# Patient Record
Sex: MALE | Race: OTHER | URBAN - METROPOLITAN AREA
[De-identification: names, ages, dates, MRNs, and addresses within clinical notes are randomized per-mention and may not be internally consistent; named-entity substitution may affect disease eponyms.]

---

## 2022-06-15 ENCOUNTER — OFFICE VISIT (OUTPATIENT)
Dept: FAMILY MEDICINE CLINIC | Age: 47
End: 2022-06-15
Payer: COMMERCIAL

## 2022-06-15 ENCOUNTER — APPOINTMENT (OUTPATIENT)
Dept: FAMILY MEDICINE CLINIC | Age: 47
End: 2022-06-15

## 2022-06-15 VITALS
SYSTOLIC BLOOD PRESSURE: 152 MMHG | RESPIRATION RATE: 16 BRPM | WEIGHT: 209.6 LBS | DIASTOLIC BLOOD PRESSURE: 73 MMHG | BODY MASS INDEX: 32.9 KG/M2 | HEART RATE: 87 BPM | OXYGEN SATURATION: 99 % | HEIGHT: 67 IN | TEMPERATURE: 98.2 F

## 2022-06-15 DIAGNOSIS — Z00.00 ROUTINE ADULT HEALTH MAINTENANCE: ICD-10-CM

## 2022-06-15 DIAGNOSIS — I10 BENIGN ESSENTIAL HTN: ICD-10-CM

## 2022-06-15 DIAGNOSIS — Z78.9 USES SPANISH AS PRIMARY SPOKEN LANGUAGE: ICD-10-CM

## 2022-06-15 DIAGNOSIS — Z11.3 SCREEN FOR STD (SEXUALLY TRANSMITTED DISEASE): ICD-10-CM

## 2022-06-15 DIAGNOSIS — K64.9 BLEEDING HEMORRHOID: ICD-10-CM

## 2022-06-15 DIAGNOSIS — Z86.59 HISTORY OF DEPRESSION: ICD-10-CM

## 2022-06-15 DIAGNOSIS — Z76.89 ESTABLISHING CARE WITH NEW DOCTOR, ENCOUNTER FOR: Primary | ICD-10-CM

## 2022-06-15 PROCEDURE — 99204 OFFICE O/P NEW MOD 45 MIN: CPT | Performed by: STUDENT IN AN ORGANIZED HEALTH CARE EDUCATION/TRAINING PROGRAM

## 2022-06-15 NOTE — PROGRESS NOTES
Kip Cid (: 1975) is a 42 y.o. male, new to provider, here for:    ASSESSMENT/PLAN:    ICD-10-CM ICD-9-CM   1. Establishing care with new doctor, encounter for  Z76.89 V65.8   2. Uses Icelandic as primary spoken language  Z78.9 V40.1   3. Routine adult health maintenance  Z00.00 V70.0   4. Benign essential HTN  I10 401.1   5. Screen for STD (sexually transmitted disease)  Z11.3 V74.5   6. Bleeding hemorrhoid  K64.9 455.8   7. History of depression  Z86.59 V11.8     #HTN - elevated  BP cuff at home? Yes  Patient reports taking BP medication once daily 2.5mg, unsure which med, agreeable to bring to follow up visit. ? lisinopril or ?amlodipine  Reviewed goals of BP and encouraged to monitor on occasion and if blood pressure measurements at home are close to or higher than 140/90 frequently then we should make adjustments to medication regimen. Reviewed risk of stroke or cardiovascular changes with persistently elevated BP. BP Readings from Last 3 Encounters:   06/15/22 (!) 152/73     #Bleeding hemorrhoids ~1wk   Tried hemorrhoid cream with some relief   Reviewed optimal use of OTC management and provided instructions. Would offer GI referral if limited improvement with conservative tx. Reports had colonoscopy 5-6yrs ago without abnormality in VB, unsure who performed it. #Tobacco use disorder - 1ppd desde 20y/o - contemplative  Reviewed that the best rates of smoking cessation success are found with a combination of behavioral changes, medications, and nicotine replacement therapy. Encouraged to follow up for assistance if interested. #Hx of depression   Previously was on treatment, unsure which medication with good response. Reviewed to follow up if symptoms return as treatment can take up to 2mo to become effective.      Orders Placed This Encounter    HEMOGLOBIN A1C WITH EAG     Standing Status:   Future     Number of Occurrences:   1     Standing Expiration Date:   6/15/2023   Didi Dominguez CBC WITH AUTOMATED DIFF     Standing Status:   Future     Number of Occurrences:   1     Standing Expiration Date:   8/82/5716    METABOLIC PANEL, COMPREHENSIVE     Standing Status:   Future     Number of Occurrences:   1     Standing Expiration Date:   6/15/2023    T4, FREE     Standing Status:   Future     Number of Occurrences:   1     Standing Expiration Date:   6/15/2023    LIPID PANEL     Standing Status:   Future     Number of Occurrences:   1     Standing Expiration Date:   6/15/2023    TSH 3RD GENERATION     Standing Status:   Future     Number of Occurrences:   1     Standing Expiration Date:   6/15/2023    VITAMIN D, 25 HYDROXY     Standing Status:   Future     Number of Occurrences:   1     Standing Expiration Date:   6/15/2023    HIV 1/2 AG/AB, 4TH GENERATION,W RFLX CONFIRM     Standing Status:   Future     Number of Occurrences:   1     Standing Expiration Date:   6/16/2023    CHLAMYDIA/NEISSERIA AMPLIFICATION     Standing Status:   Future     Number of Occurrences:   1     Standing Expiration Date:   6/15/2023     Order Specific Question:   Specimen type/source     Answer:   Urine [258]    T PALLIDUM AB     Standing Status:   Future     Number of Occurrences:   1     Standing Expiration Date:   6/15/2023    HSV 1/2 AB, IGM     Standing Status:   Future     Number of Occurrences:   1     Standing Expiration Date:   6/16/2023    HEPATITIS C AB     Standing Status:   Future     Number of Occurrences:   1     Standing Expiration Date:   6/16/2023     Return in about 1 week (around 6/22/2022) for 30 min follow up, BP check, bring all meds. SUBJECTIVE/OBJECTIVE:  Extensive review of medical history and medications completed to facilitate transfer of care. VALERIE 2/7 6/15/2022   Feeling nervous, anxious or on edge?  0   Not being able to stop or control worrying? 0   VALERIE-2 Subtotal 0     3 most recent PHQ Screens 6/15/2022   Little interest or pleasure in doing things Not at all   Feeling down, depressed, irritable, or hopeless Not at all   Total Score PHQ 2 0   Trouble falling or staying asleep, or sleeping too much Not at all   Feeling tired or having little energy Not at all   Poor appetite, weight loss, or overeating Not at all   Feeling bad about yourself - or that you are a failure or have let yourself or your family down Not at all   Trouble concentrating on things such as school, work, reading, or watching TV Not at all   Moving or speaking so slowly that other people could have noticed; or the opposite being so fidgety that others notice Not at all   Thoughts of being better off dead, or hurting yourself in some way Not at all   PHQ 9 Score 0   How difficult have these problems made it for you to do your work, take care of your home and get along with others Not difficult at all     No current outpatient medications on file. No current facility-administered medications for this visit. Visit Vitals  BP (!) 152/73 (BP 1 Location: Right arm, BP Patient Position: Sitting, BP Cuff Size: Adult)   Pulse 87   Temp 98.2 °F (36.8 °C) (Temporal)   Resp 16   Ht 5' 7.13\" (1.705 m)   Wt 209 lb 9.6 oz (95.1 kg)   SpO2 99%   BMI 32.70 kg/m²     Physical Exam  Vitals and nursing note reviewed. Constitutional:       General: He is not in acute distress. Interventions: Face mask in place. Cardiovascular:      Rate and Rhythm: Normal rate. Pulses: Normal pulses. Pulmonary:      Effort: Pulmonary effort is normal. No respiratory distress. Neurological:      Mental Status: He is alert and oriented to person, place, and time. Gait: Gait normal.   Psychiatric:         Mood and Affect: Mood normal.         Behavior: Behavior normal.         Thought Content:  Thought content normal.         Judgment: Judgment normal.             Kristen Rodriguez,   Family Medicine  06/15/2022

## 2022-06-15 NOTE — PATIENT INSTRUCTIONS
Increase hydration and fiber intake  Increasing dietary fiber and water intake is the best way to soften and bulk the stool. The recommended dietary fiber intake is between 20 and 35 grams per day. Think lots of fresh foods, fruits and veggies have a lot of fiber. Sitz bath  Warm sitz baths, which can relax the anal sphincter and improve blood flow to the anal mucosa, are recommended for patients with anal fissures and hemorrhoids. During a sitz bath, the anus is immersed in warm water for approximately 10 to 15 minutes two to three times daily. Sitz bath kits are available in most [facility], and portable bowls can be used at work or school. At home, it is also possible to use a bathtub for sitz bath by filling it with two to three inches of warm water. Additives such as soap and bubble bath are not recommended. After a sitz bath, it is important to towel dry the anal area well. Stool softeners, oral:    Docusate sodium 100 to 200 mg orally two times per day Decreases straining. Astringent and protectant:     Witch hazel (eg, Tucks, Preparation H pads) As needed up to six times per day or after each bowel movement     Corticosteroids, topical:    Hydrocortisone rectal creams 1 to 2.5% (eg, Anusol-HC, Preparation H, Proctosol); hydrocortisone rectal suppository 25 to 30 mg (eg, Anusol-HC) Rectal cream: Apply sparingly up to two times daily  Suppository: 1 suppository twice daily Provides local anti-inflammatory and analgesic effect. For intermittent short-term use (ie, no more than seven days) due to risk of mucosal thinning; refer to text. Avoid in persons with local infection. Variable systemic absorption; caution in pregnancy. Local numbing agents for:    ?Temporary relief of acute pain and pruritus. ? Intended for short-term, intermittent use.     Benzocaine 5 to 20% rectal ointment (eg, Americaine) Sparingly up to six times per day   OR  Pramoxine 1% rectal foam, ointment, wipes (eg, Proctofoam, Pramox) As needed up to five times daily

## 2022-06-15 NOTE — PROGRESS NOTES
Femi King is a 42 y.o. male (: 1975) presenting to address:  Femi King is a 42 y.o. male (: 1975) presenting to address:    No chief complaint on file. Vitals:    06/15/22 1448   BP: (!) 152/73   Pulse: 87   Resp: 16   Temp: 98.2 °F (36.8 °C)   TempSrc: Temporal   SpO2: 99%   Weight: 209 lb 9.6 oz (95.1 kg)   Height: 5' 7.13\" (1.705 m)   PainSc:   0 - No pain       Hearing/Vision:   No exam data present    Learning Assessment:     Learning Assessment 6/15/2022   PRIMARY LEARNER Patient   HIGHEST LEVEL OF EDUCATION - PRIMARY LEARNER  2 YEARS OF COLLEGE   PRIMARY LANGUAGE Kinyarwanda   LEARNER PREFERENCE PRIMARY DEMONSTRATION     LISTENING     READING   ANSWERED BY patient   RELATIONSHIP SELF     Depression Screening:   No flowsheet data found. Fall Risk Assessment:     Fall Risk Assessment, last 12 mths 6/15/2022   Able to walk? Yes   Fall in past 12 months? 0   Do you feel unsteady? 0   Are you worried about falling 0     Abuse Screening:     Abuse Screening Questionnaire 6/15/2022   Do you ever feel afraid of your partner? N   Are you in a relationship with someone who physically or mentally threatens you? N   Is it safe for you to go home? Y     ADL Assessment:   No flowsheet data found. Coordination of Care Questionaire:   1. \"Have you been to the ER, urgent care clinic since your last visit? Hospitalized since your last visit? \" No    2. \"Have you seen or consulted any other health care providers outside of the [address] [place] [address] since your last visit? \" No     3. For patients aged 39-70: Has the patient had a colonoscopy / FIT/ Cologuard? NA - based on age      If the patient is female:    4. For patients aged 41-77: Has the patient had a mammogram within the past 2 years? NA - based on age or sex  See top three    5. For patients aged 21-65: Has the patient had a pap smear? NA - based on age or sex    Advanced Directive:   1.  Do you have an Advanced Directive? NO    2. Would you like information on Advanced Directives? NO  No chief complaint on file. There were no vitals filed for this visit. Hearing/Vision:   No exam data present    Learning Assessment:   No flowsheet data found. Depression Screening:   No flowsheet data found. Fall Risk Assessment:   No flowsheet data found. Abuse Screening:   No flowsheet data found. ADL Assessment:   No flowsheet data found.

## 2022-06-16 LAB
CHLAMYDIA AMPLIFIED URINE: NEGATIVE
GC AMPLIFIED URINE,919: NEGATIVE
TRICH NUCU, 17621: NEGATIVE

## 2022-06-17 LAB
25(OH)D3 SERPL-MCNC: 29.1 NG/ML (ref 32–100)
A-G RATIO,AGRAT: 1.8 RATIO (ref 1.1–2.6)
ABSOLUTE LYMPHOCYTE COUNT, 10803: 1 K/UL (ref 1–4.8)
ALBUMIN SERPL-MCNC: 4.6 G/DL (ref 3.5–5)
ALP SERPL-CCNC: 95 U/L (ref 25–115)
ALT SERPL-CCNC: 24 U/L (ref 5–40)
ANION GAP SERPL CALC-SCNC: 11 MMOL/L (ref 3–15)
AST SERPL W P-5'-P-CCNC: 21 U/L (ref 10–37)
AVG GLU, 10930: 130 MG/DL (ref 91–123)
BASOPHILS # BLD: 0 K/UL (ref 0–0.2)
BASOPHILS NFR BLD: 0 % (ref 0–2)
BILIRUB SERPL-MCNC: 0.2 MG/DL (ref 0.2–1.2)
BUN SERPL-MCNC: 15 MG/DL (ref 6–22)
CALCIUM SERPL-MCNC: 9.6 MG/DL (ref 8.4–10.5)
CHLORIDE SERPL-SCNC: 103 MMOL/L (ref 98–110)
CHOLEST SERPL-MCNC: 226 MG/DL (ref 110–200)
CO2 SERPL-SCNC: 26 MMOL/L (ref 20–32)
CREAT SERPL-MCNC: 0.9 MG/DL (ref 0.5–1.2)
EOSINOPHIL # BLD: 0.1 K/UL (ref 0–0.5)
EOSINOPHIL NFR BLD: 2 % (ref 0–6)
ERYTHROCYTE [DISTWIDTH] IN BLOOD BY AUTOMATED COUNT: 14.6 % (ref 10–15.5)
GLOBULIN,GLOB: 2.5 G/DL (ref 2–4)
GLOMERULAR FILTRATION RATE: >60 ML/MIN/1.73 SQ.M.
GLUCOSE SERPL-MCNC: 100 MG/DL (ref 70–99)
GRANULOCYTES,GRANS: 71 % (ref 40–75)
HBA1C MFR BLD HPLC: 6.2 % (ref 4.8–5.6)
HCT VFR BLD AUTO: 49.6 % (ref 36.6–51.9)
HCV AB SER IA-ACNC: NORMAL
HDLC SERPL-MCNC: 4.7 MG/DL (ref 0–5)
HDLC SERPL-MCNC: 48 MG/DL
HGB BLD-MCNC: 15.4 G/DL (ref 13.2–17.3)
HIV -1/0/2 AG/AB WITH REFLEX, 13463: NON REACTIVE
HIV 1 & 2 AB SER-IMP: NORMAL
LDL/HDL RATIO,LDHD: 2.7
LDLC SERPL CALC-MCNC: 128 MG/DL (ref 50–99)
LYMPHOCYTES, LYMLT: 15 % (ref 20–45)
MCH RBC QN AUTO: 28 PG (ref 26–34)
MCHC RBC AUTO-ENTMCNC: 31 G/DL (ref 31–36)
MCV RBC AUTO: 91 FL (ref 80–95)
MONOCYTES # BLD: 0.8 K/UL (ref 0.1–1)
MONOCYTES NFR BLD: 12 % (ref 3–12)
NEUTROPHILS # BLD AUTO: 4.9 K/UL (ref 1.8–7.7)
NON-HDL CHOLESTEROL, 011976: 178 MG/DL
PLATELET # BLD AUTO: 269 K/UL (ref 140–440)
PMV BLD AUTO: 10.8 FL (ref 9–13)
POTASSIUM SERPL-SCNC: 4.2 MMOL/L (ref 3.5–5.5)
PROT SERPL-MCNC: 7.1 G/DL (ref 6.4–8.3)
RBC # BLD AUTO: 5.46 M/UL (ref 3.8–5.8)
SODIUM SERPL-SCNC: 140 MMOL/L (ref 133–145)
SYPHILIS (T.PALLIDUM) IGG/IGM: NON REACTIVE
T4 FREE SERPL-MCNC: 1 NG/DL (ref 0.9–1.8)
TRIGL SERPL-MCNC: 250 MG/DL (ref 40–149)
TSH SERPL DL<=0.005 MIU/L-ACNC: 3.5 MCU/ML (ref 0.27–4.2)
VLDLC SERPL CALC-MCNC: 50 MG/DL (ref 8–30)
WBC # BLD AUTO: 7 K/UL (ref 4–11)

## 2022-06-21 NOTE — PROGRESS NOTES
Jim Matson (: 1975) is a 42 y.o. male, established patient, here for:    ASSESSMENT/PLAN:    ICD-10-CM ICD-9-CM   1. Dyslipidemia, goal LDL below 100  E78.5 272.4   2. Benign essential HTN  I10 401.1   3. Uses Macedonian as primary spoken language  Z78.9 V40.1   4. Bleeding hemorrhoid  K64.9 455.8   5. History of depression  Z86.59 V11.8   6. Tobacco use  Z72.0 305.1     #Vit D Def - reviewed dx and recommended supplement dosing    #HLD - Goal < 100  Reviewed ASCVD and agreeable to start mod intensity statin. Lab Results   Component Value Date/Time    LDL, calculated 128 (H) 06/15/2022 03:41 PM     The 10-year ASCVD risk score (Karlee Stewart et al., 2013) is: 8.1%    Values used to calculate the score:      Age: 42 years      Sex: Male      Is Non- : No      Diabetic: No      Tobacco smoker: Yes      Systolic Blood Pressure: 997 mmHg      Is BP treated: Yes      HDL Cholesterol: 48 mg/dL      Total Cholesterol: 226 mg/dL    #HTN - averages remain above goal  BP cuff at home? Yes; Brought in home readings since last visit with average ~559 systolic  Has been using Lisinopril-HCTZ without notable AE; Agreeable to increase dose to 20-25 to reach target BP    BP Readings from Last 3 Encounters:   22 132/84   06/15/22 (!) 152/73     #Prediabetes - new dx  Reviewed diagnosis and recommendations for behavioral/dietary changes to improve. Encouraged to continue once to twice yearly monitoring of A1C. Also discussed consideration of initiating Metformin to help reduce insulin resistance. Lab Results   Component Value Date/Time    Hemoglobin A1c 6.2 (H) 06/15/2022 03:41 PM     #Bleeding hemorrhoids ~1wk   Tried hemorrhoid cream with some relief   Reviewed optimal use of OTC management and provided instructions. Would offer GI referral if limited improvement with conservative tx. Reports had colonoscopy 5-6yrs ago without abnormality in VB, unsure who performed it.    #Tobacco use disorder - 1ppd desde 20y/o - contemplative  Reviewed that the best rates of smoking cessation success are found with a combination of behavioral changes, medications, and nicotine replacement therapy. Encouraged to follow up for assistance if interested.      #Hx of depression   Previously was on treatment, unsure which medication with good response. Reviewed to follow up if symptoms return as treatment can take up to 2mo to become effective. #Uses Cypriot as primary spoken language  [ entire visit conducted in [address] [facility] ]     Orders Placed This Encounter    lisinopril-hydroCHLOROthiazide (PRINZIDE, ZESTORETIC) 20-25 mg per tablet     Sig: Take 1 Tablet by mouth daily. Indications: high blood pressure     Dispense:  90 Tablet     Refill:  1    atorvastatin (LIPITOR) 40 mg tablet     Sig: Take 1 Tablet by mouth nightly. Indications: high cholesterol, \     Dispense:  90 Tablet     Refill:  2     Return in about 3 months (around 9/22/2022) for POC A1C check, BP check. SUBJECTIVE/OBJECTIVE:  Last PCP visit: 6/15/2022    Since last visit:   Any changes in health, procedures, hospital visits, or specialist visits? Denies  Tolerating medications without adverse reactions? Reports good compliance with Rx without notable adverse effects. Current Outpatient Medications   Medication Sig    lisinopril-hydroCHLOROthiazide (PRINZIDE, ZESTORETIC) 20-25 mg per tablet Take 1 Tablet by mouth daily. Indications: high blood pressure    atorvastatin (LIPITOR) 40 mg tablet Take 1 Tablet by mouth nightly. Indications: high cholesterol, \     No current facility-administered medications for this visit. Visit Vitals  /84 (BP 1 Location: Right arm, BP Patient Position: Sitting, BP Cuff Size: Adult)   Pulse 97   Temp 98.5 °F (36.9 °C) (Temporal)   Resp 16   Ht 5' 7.13\" (1.705 m)   Wt 210 lb (95.3 kg)   SpO2 100%   BMI 32.76 kg/m²     Physical Exam  Vitals and nursing note reviewed. Constitutional:       General: He is not in acute distress. Interventions: Face mask in place. Cardiovascular:      Rate and Rhythm: Normal rate. Pulses: Normal pulses. Pulmonary:      Effort: Pulmonary effort is normal. No respiratory distress. Neurological:      Mental Status: He is alert and oriented to person, place, and time. Gait: Gait normal.   Psychiatric:         Mood and Affect: Mood normal.         Behavior: Behavior normal.         Thought Content:  Thought content normal.         Judgment: Judgment normal.           Leonora Polk,   Family Medicine  06/22/2022

## 2022-06-21 NOTE — PROGRESS NOTES
Will review in detail at follow up:     Future Appointments  6/22/2022  3:00 PM    Milady Amaro, DO      BSMA                BS AMB    Vit D def  , The 10-year ASCVD risk score (Olive Rasmussen, et al., 2013) is: 8.8% - would offer mod intensity statin

## 2022-06-22 ENCOUNTER — OFFICE VISIT (OUTPATIENT)
Dept: FAMILY MEDICINE CLINIC | Age: 47
End: 2022-06-22
Payer: COMMERCIAL

## 2022-06-22 VITALS
BODY MASS INDEX: 32.96 KG/M2 | TEMPERATURE: 98.5 F | HEIGHT: 67 IN | WEIGHT: 210 LBS | OXYGEN SATURATION: 100 % | RESPIRATION RATE: 16 BRPM | SYSTOLIC BLOOD PRESSURE: 132 MMHG | DIASTOLIC BLOOD PRESSURE: 84 MMHG | HEART RATE: 97 BPM

## 2022-06-22 DIAGNOSIS — Z78.9 USES SPANISH AS PRIMARY SPOKEN LANGUAGE: ICD-10-CM

## 2022-06-22 DIAGNOSIS — I10 BENIGN ESSENTIAL HTN: ICD-10-CM

## 2022-06-22 DIAGNOSIS — E78.5 DYSLIPIDEMIA, GOAL LDL BELOW 100: Primary | ICD-10-CM

## 2022-06-22 DIAGNOSIS — Z86.59 HISTORY OF DEPRESSION: ICD-10-CM

## 2022-06-22 DIAGNOSIS — Z72.0 TOBACCO USE: ICD-10-CM

## 2022-06-22 DIAGNOSIS — K64.9 BLEEDING HEMORRHOID: ICD-10-CM

## 2022-06-22 PROCEDURE — 99214 OFFICE O/P EST MOD 30 MIN: CPT | Performed by: STUDENT IN AN ORGANIZED HEALTH CARE EDUCATION/TRAINING PROGRAM

## 2022-06-22 RX ORDER — LISINOPRIL 10 MG/1
10 TABLET ORAL DAILY
COMMUNITY
End: 2022-06-22 | Stop reason: CLARIF

## 2022-06-22 RX ORDER — LISINOPRIL AND HYDROCHLOROTHIAZIDE 20; 25 MG/1; MG/1
1 TABLET ORAL DAILY
Qty: 90 TABLET | Refills: 1 | Status: SHIPPED | OUTPATIENT
Start: 2022-06-22 | End: 2022-09-27 | Stop reason: SDUPTHER

## 2022-06-22 RX ORDER — ATORVASTATIN CALCIUM 40 MG/1
40 TABLET, FILM COATED ORAL
Qty: 90 TABLET | Refills: 2 | Status: SHIPPED | OUTPATIENT
Start: 2022-06-22 | End: 2022-09-27 | Stop reason: SINTOL

## 2022-06-22 NOTE — PATIENT INSTRUCTIONS
Blood pressure goals:  · If blood pressure measurements at home are close to or higher than 140/90 frequently come in sooner for evaluation and consideration of adjustment of blood pressure medication.

## 2022-06-22 NOTE — PROGRESS NOTES
Jj Melgar is a 42 y.o. male (: 1975) presenting to address:    Chief Complaint   Patient presents with    Blood Pressure Check     1 week f/u for bp       Vitals:    22 1452   BP: 132/84   Pulse: 97   Resp: 16   Temp: 98.5 °F (36.9 °C)   TempSrc: Temporal   SpO2: 100%   Weight: 210 lb (95.3 kg)   Height: 5' 7.13\" (1.705 m)   PainSc:   0 - No pain       Hearing/Vision:   No exam data present    Learning Assessment:     Learning Assessment 6/15/2022   PRIMARY LEARNER Patient   HIGHEST LEVEL OF EDUCATION - PRIMARY LEARNER  2 YEARS OF COLLEGE   PRIMARY LANGUAGE Thai   LEARNER PREFERENCE PRIMARY DEMONSTRATION     LISTENING     READING   ANSWERED BY patient   RELATIONSHIP SELF     Depression Screening:     3 most recent PHQ Screens 2022   Little interest or pleasure in doing things Not at all   Feeling down, depressed, irritable, or hopeless Not at all   Total Score PHQ 2 0   Trouble falling or staying asleep, or sleeping too much -   Feeling tired or having little energy -   Poor appetite, weight loss, or overeating -   Feeling bad about yourself - or that you are a failure or have let yourself or your family down -   Trouble concentrating on things such as school, work, reading, or watching TV -   Moving or speaking so slowly that other people could have noticed; or the opposite being so fidgety that others notice -   Thoughts of being better off dead, or hurting yourself in some way -   PHQ 9 Score -   How difficult have these problems made it for you to do your work, take care of your home and get along with others -     Fall Risk Assessment:     Fall Risk Assessment, last 12 mths 6/15/2022   Able to walk? Yes   Fall in past 12 months? 0   Do you feel unsteady? 0   Are you worried about falling 0     Abuse Screening:     Abuse Screening Questionnaire 6/15/2022   Do you ever feel afraid of your partner?  N   Are you in a relationship with someone who physically or mentally threatens you? N   Is it safe for you to go home? Y     ADL Assessment:   No flowsheet data found. Coordination of Care Questionaire:   1. \"Have you been to the ER, urgent care clinic since your last visit? Hospitalized since your last visit? \" No    2. \"Have you seen or consulted any other health care providers outside of the [address] [place] [address] since your last visit? \" No     3. For patients aged 39-70: Has the patient had a colonoscopy / FIT/ Cologuard? NA - based on age      If the patient is female:    4. For patients aged 41-77: Has the patient had a mammogram within the past 2 years? NA - based on age or sex  See top three    5. For patients aged 21-65: Has the patient had a pap smear? NA - based on age or sex    Advanced Directive:   1. Do you have an Advanced Directive? NO    2. Would you like information on Advanced Directives?  NO

## 2022-07-01 ENCOUNTER — TELEPHONE (OUTPATIENT)
Dept: FAMILY MEDICINE CLINIC | Age: 47
End: 2022-07-01

## 2022-07-01 NOTE — TELEPHONE ENCOUNTER
Pt stated that the medication is not doing anything for him. Pt stated that he is unable to sleep.  Would like to change medications(atorvastatin)

## 2022-09-19 NOTE — PROGRESS NOTES
Que Strong (: 1975) is a 42 y.o. male, ESTABLISHED patient, here for FOLLOW UP:    ICD-10-CM ICD-9-CM   1. HTN, goal below 130/80  I10 401.9   2. Prediabetes  R73.03 790.29   3. Benign essential HTN  I10 401.1   4. Dyslipidemia, goal LDL below 100  E78.5 272.4   5. Uses Persian as primary spoken language  Z78.9 V40.1   6. Tobacco use  Z72.0 305.1     Assessment   Plan     #Vit D Def - reviewed dx and recommended supplement dosing  Lab Results   Component Value Date/Time    VITAMIN D, 25-HYDROXY 29.1 (L) 06/15/2022 03:41 PM       #HLD - Goal < 100  Couldn't sleep with Lipitor 40mg, c/o marreo and discontinued; Plan to work on dietary/behavioral efforts and monitor lipid panel. Lab Results   Component Value Date/Time     LDL, calculated 128 (H) 06/15/2022 03:41 PM      The 10-year ASCVD risk score (J Luis Arriaza, et al., 2013) is: 8.1%    Values used to calculate the score:      Age: 42 years      Sex: Male      Is Non- : No      Diabetic: No      Tobacco smoker: Yes      Systolic Blood Pressure: 729 mmHg      Is BP treated: Yes      HDL Cholesterol: 48 mg/dL      Total Cholesterol: 226 mg/dL     #HTN - Goal <130/80  BP cuff at home? Yes  Tolerating medications without notable AE, will continue regimen unchanged    BP Readings from Last 3 Encounters:   22 130/81   22 132/84   06/15/22 (!) 152/73     #Prediabetes - improved!! Commended on success  Reviewed diagnosis and recommendations for behavioral/dietary changes to improve. Encouraged to continue once to twice yearly monitoring of A1C. Also discussed consideration of initiating Metformin to help reduce insulin resistance.      Last Point of Care HGB A1C  Hemoglobin A1c (POC)   Date Value Ref Range Status   2022 5.7 % Final             Lab Results   Component Value Date/Time     Hemoglobin A1c 6.2 (H) 06/15/2022 03:41 PM      #Tobacco use disorder - 1ppd desde 20y/o - contemplative  Reviewed that the best rates of smoking cessation success are found with a combination of behavioral changes, medications, and nicotine replacement therapy. Encouraged to follow up for assistance if interested. #Hx of depression   Previously was on treatment, unsure which medication with good response. Reviewed to follow up if symptoms return as treatment can take up to 2mo to become effective. #Uses Thai as primary spoken language  [ entire visit conducted in [address] [facility] ]            Orders Placed This Encounter    AMB POC HEMOGLOBIN A1C    lisinopril-hydroCHLOROthiazide (PRINZIDE, ZESTORETIC) 20-25 mg per tablet     Sig: Take 1 Tablet by mouth daily. Indications: high blood pressure     Dispense:  90 Tablet     Refill:  3     Return in about 6 months (around 3/27/2023) for 30 min follow up, routine care with PCP. Subjective   Last PCP visit: 6/22/2022  Since last visit:   Any changes in health, procedures, hospital visits, or specialist visits? Denies   Tolerating medications without adverse reactions? Reports good compliance with Rx without notable adverse effects. Current Outpatient Medications   Medication Instructions    lisinopril-hydroCHLOROthiazide (PRINZIDE, ZESTORETIC) 20-25 mg per tablet 1 Tablet, Oral, DAILY      Objective   Visit Vitals  /81 (BP 1 Location: Right arm, BP Patient Position: Sitting, BP Cuff Size: Adult)   Pulse 72   Temp 98.6 °F (37 °C) (Temporal)   Resp 16   Ht 5' 7.13\" (1.705 m)   Wt 205 lb 9.6 oz (93.3 kg)   SpO2 96%   BMI 32.08 kg/m²     Physical Exam  Vitals and nursing note reviewed. Constitutional:       General: He is not in acute distress. Interventions: Face mask in place. Cardiovascular:      Rate and Rhythm: Normal rate. Pulses: Normal pulses. Pulmonary:      Effort: Pulmonary effort is normal. No respiratory distress. Neurological:      Mental Status: He is alert and oriented to person, place, and time.       Gait: Gait normal.   Psychiatric: Mood and Affect: Mood normal.         Behavior: Behavior normal.         Thought Content:  Thought content normal.         Judgment: Judgment normal.          Chanelle Degroot,   Family Medicine  09/27/2022

## 2022-09-27 ENCOUNTER — OFFICE VISIT (OUTPATIENT)
Dept: FAMILY MEDICINE CLINIC | Age: 47
End: 2022-09-27
Payer: COMMERCIAL

## 2022-09-27 VITALS
HEIGHT: 67 IN | OXYGEN SATURATION: 96 % | BODY MASS INDEX: 32.27 KG/M2 | WEIGHT: 205.6 LBS | TEMPERATURE: 98.6 F | SYSTOLIC BLOOD PRESSURE: 130 MMHG | HEART RATE: 72 BPM | RESPIRATION RATE: 16 BRPM | DIASTOLIC BLOOD PRESSURE: 81 MMHG

## 2022-09-27 DIAGNOSIS — I10 BENIGN ESSENTIAL HTN: ICD-10-CM

## 2022-09-27 DIAGNOSIS — R73.03 PREDIABETES: ICD-10-CM

## 2022-09-27 DIAGNOSIS — Z72.0 TOBACCO USE: ICD-10-CM

## 2022-09-27 DIAGNOSIS — I10 HTN, GOAL BELOW 130/80: Primary | ICD-10-CM

## 2022-09-27 DIAGNOSIS — E78.5 DYSLIPIDEMIA, GOAL LDL BELOW 100: ICD-10-CM

## 2022-09-27 DIAGNOSIS — Z78.9 USES SPANISH AS PRIMARY SPOKEN LANGUAGE: ICD-10-CM

## 2022-09-27 LAB — HBA1C MFR BLD HPLC: 5.7 %

## 2022-09-27 PROCEDURE — 99214 OFFICE O/P EST MOD 30 MIN: CPT | Performed by: STUDENT IN AN ORGANIZED HEALTH CARE EDUCATION/TRAINING PROGRAM

## 2022-09-27 PROCEDURE — 83036 HEMOGLOBIN GLYCOSYLATED A1C: CPT | Performed by: STUDENT IN AN ORGANIZED HEALTH CARE EDUCATION/TRAINING PROGRAM

## 2022-09-27 RX ORDER — LISINOPRIL AND HYDROCHLOROTHIAZIDE 20; 25 MG/1; MG/1
1 TABLET ORAL DAILY
Qty: 90 TABLET | Refills: 3 | Status: SHIPPED | OUTPATIENT
Start: 2022-09-27

## 2022-09-27 NOTE — PROGRESS NOTES
Leslie Villarreal is a 42 y.o. male (: 1975) presenting to address:    Chief Complaint   Patient presents with    Blood Pressure Check    Diabetes     a1c       Vitals:    22 1424   BP: 130/81   Pulse: 72   Resp: 16   Temp: 98.6 °F (37 °C)   TempSrc: Temporal   SpO2: 96%   Weight: 205 lb 9.6 oz (93.3 kg)   Height: 5' 7.13\" (1.705 m)   PainSc:   0 - No pain       Hearing/Vision:   No results found. Learning Assessment:     Learning Assessment 6/15/2022   PRIMARY LEARNER Patient   HIGHEST LEVEL OF EDUCATION - PRIMARY LEARNER  2 YEARS OF COLLEGE   PRIMARY LANGUAGE Romansh   LEARNER PREFERENCE PRIMARY DEMONSTRATION     LISTENING     READING   ANSWERED BY patient   RELATIONSHIP SELF     Depression Screening:     3 most recent PHQ Screens 2022   Little interest or pleasure in doing things Not at all   Feeling down, depressed, irritable, or hopeless Not at all   Total Score PHQ 2 0   Trouble falling or staying asleep, or sleeping too much -   Feeling tired or having little energy -   Poor appetite, weight loss, or overeating -   Feeling bad about yourself - or that you are a failure or have let yourself or your family down -   Trouble concentrating on things such as school, work, reading, or watching TV -   Moving or speaking so slowly that other people could have noticed; or the opposite being so fidgety that others notice -   Thoughts of being better off dead, or hurting yourself in some way -   PHQ 9 Score -   How difficult have these problems made it for you to do your work, take care of your home and get along with others -     Fall Risk Assessment:     Fall Risk Assessment, last 12 mths 6/15/2022   Able to walk? Yes   Fall in past 12 months? 0   Do you feel unsteady? 0   Are you worried about falling 0     Abuse Screening:     Abuse Screening Questionnaire 6/15/2022   Do you ever feel afraid of your partner?  N   Are you in a relationship with someone who physically or mentally threatens you? N   Is it safe for you to go home? Y     ADL Assessment:   No flowsheet data found. Coordination of Care Questionaire:   1. \"Have you been to the ER, urgent care clinic since your last visit? Hospitalized since your last visit? \" No    2. \"Have you seen or consulted any other health care providers outside of the [address] [place] [address] since your last visit? \" No     3. For patients aged 39-70: Has the patient had a colonoscopy / FIT/ Cologuard? NA - based on age      If the patient is female:    4. For patients aged 41-77: Has the patient had a mammogram within the past 2 years? NA - based on age or sex  See top three    5. For patients aged 21-65: Has the patient had a pap smear? NA - based on age or sex    Advanced Directive:   1. Do you have an Advanced Directive? NO    2. Would you like information on Advanced Directives?  NO

## 2023-03-23 ENCOUNTER — OFFICE VISIT (OUTPATIENT)
Dept: FAMILY MEDICINE CLINIC | Facility: CLINIC | Age: 48
End: 2023-03-23
Payer: COMMERCIAL

## 2023-03-23 VITALS
OXYGEN SATURATION: 99 % | WEIGHT: 206 LBS | SYSTOLIC BLOOD PRESSURE: 119 MMHG | DIASTOLIC BLOOD PRESSURE: 82 MMHG | HEIGHT: 67 IN | TEMPERATURE: 97.8 F | BODY MASS INDEX: 32.33 KG/M2 | HEART RATE: 74 BPM | RESPIRATION RATE: 16 BRPM

## 2023-03-23 DIAGNOSIS — R07.9 INTERMITTENT CHEST PAIN: Primary | ICD-10-CM

## 2023-03-23 DIAGNOSIS — E55.9 VITAMIN D DEFICIENCY: ICD-10-CM

## 2023-03-23 DIAGNOSIS — E78.5 DYSLIPIDEMIA, GOAL LDL BELOW 70: ICD-10-CM

## 2023-03-23 DIAGNOSIS — R73.03 PREDIABETES: ICD-10-CM

## 2023-03-23 DIAGNOSIS — I10 HTN, GOAL BELOW 130/80: ICD-10-CM

## 2023-03-23 DIAGNOSIS — B36.0 TINEA VERSICOLOR: ICD-10-CM

## 2023-03-23 DIAGNOSIS — F17.200 TOBACCO USE DISORDER: ICD-10-CM

## 2023-03-23 PROCEDURE — 93000 ELECTROCARDIOGRAM COMPLETE: CPT | Performed by: STUDENT IN AN ORGANIZED HEALTH CARE EDUCATION/TRAINING PROGRAM

## 2023-03-23 PROCEDURE — 99214 OFFICE O/P EST MOD 30 MIN: CPT | Performed by: STUDENT IN AN ORGANIZED HEALTH CARE EDUCATION/TRAINING PROGRAM

## 2023-03-23 PROCEDURE — 99407 BEHAV CHNG SMOKING > 10 MIN: CPT | Performed by: STUDENT IN AN ORGANIZED HEALTH CARE EDUCATION/TRAINING PROGRAM

## 2023-03-23 PROCEDURE — 3078F DIAST BP <80 MM HG: CPT | Performed by: STUDENT IN AN ORGANIZED HEALTH CARE EDUCATION/TRAINING PROGRAM

## 2023-03-23 PROCEDURE — 3074F SYST BP LT 130 MM HG: CPT | Performed by: STUDENT IN AN ORGANIZED HEALTH CARE EDUCATION/TRAINING PROGRAM

## 2023-03-23 RX ORDER — VARENICLINE TARTRATE
KIT
Qty: 1 EACH | Refills: 0 | Status: SHIPPED | OUTPATIENT
Start: 2023-03-23

## 2023-03-23 RX ORDER — CLOTRIMAZOLE AND BETAMETHASONE DIPROPIONATE 10; .64 MG/G; MG/G
CREAM TOPICAL
Qty: 45 G | Refills: 1 | Status: SHIPPED | OUTPATIENT
Start: 2023-03-23

## 2023-03-23 RX ORDER — BUPROPION HYDROCHLORIDE 150 MG/1
TABLET ORAL
Qty: 180 TABLET | Refills: 1 | Status: SHIPPED | OUTPATIENT
Start: 2023-03-23

## 2023-03-23 RX ORDER — ROSUVASTATIN CALCIUM 20 MG/1
20 TABLET, COATED ORAL DAILY
Qty: 90 TABLET | Refills: 3 | Status: SHIPPED | OUTPATIENT
Start: 2023-03-23

## 2023-03-23 RX ORDER — NICOTINE 21 MG/24HR
1 PATCH, TRANSDERMAL 24 HOURS TRANSDERMAL DAILY
Qty: 28 PATCH | Refills: 0 | Status: SHIPPED | OUTPATIENT
Start: 2023-03-23

## 2023-03-23 RX ORDER — VARENICLINE TARTRATE 1 MG/1
1 TABLET, FILM COATED ORAL 2 TIMES DAILY
Qty: 60 TABLET | Refills: 3 | Status: SHIPPED | OUTPATIENT
Start: 2023-03-23

## 2023-03-23 RX ORDER — ASPIRIN 81 MG/1
81 TABLET ORAL DAILY
Qty: 90 TABLET | Refills: 1 | Status: SHIPPED | OUTPATIENT
Start: 2023-03-23

## 2023-03-23 ASSESSMENT — PATIENT HEALTH QUESTIONNAIRE - PHQ9
2. FEELING DOWN, DEPRESSED OR HOPELESS: 0
1. LITTLE INTEREST OR PLEASURE IN DOING THINGS: 0
SUM OF ALL RESPONSES TO PHQ QUESTIONS 1-9: 0
SUM OF ALL RESPONSES TO PHQ QUESTIONS 1-9: 0
SUM OF ALL RESPONSES TO PHQ9 QUESTIONS 1 & 2: 0
SUM OF ALL RESPONSES TO PHQ QUESTIONS 1-9: 0
SUM OF ALL RESPONSES TO PHQ QUESTIONS 1-9: 0

## 2023-03-23 NOTE — PATIENT INSTRUCTIONS
cigarettes per day --> 7 mg patches for 56 days    Encouraged to follow up if interested in the above treatments to help with tobacco cessation.

## 2023-03-23 NOTE — PROGRESS NOTES
Flako Hudson is a 41 y.o. male (: 1975) presenting to address:    Chief Complaint   Patient presents with    Heart Problem       Vitals:    23 0924   BP: 119/82   Pulse: 74   SpO2: 99%       Coordination of Care Questionaire:   1. \"Have you been to the ER, urgent care clinic since your last visit? Hospitalized since your last visit? \" No    2. \"Have you seen or consulted any other health care providers outside of the [address] [place] [address] since your last visit? \" No     3. For patients aged 39-70: Has the patient had a colonoscopy / FIT/ Cologuard? No      If the patient is female:    4. For patients aged 41-77: Has the patient had a mammogram within the past 2 years? NA - based on age or sex      11. For patients aged 21-65: Has the patient had a pap smear? NA - based on age or sex    Advanced Directive:   1. Do you have an Advanced Directive? No    2. Would you like information on Advanced Directives?  No
No Known Allergies   Objective   /82 (Site: Left Upper Arm, Position: Sitting, Cuff Size: Large Adult)   Pulse 74   Temp 97.8 °F (36.6 °C) (Temporal)   Resp 16   Ht 5' 7\" (1.702 m)   Wt 206 lb (93.4 kg)   SpO2 99%   BMI 32.26 kg/m²   Physical Exam  Vitals and nursing note reviewed. Constitutional:       General: He is not in acute distress. Cardiovascular:      Rate and Rhythm: Normal rate. Pulmonary:      Effort: Pulmonary effort is normal. No respiratory distress. Neurological:      Mental Status: He is alert and oriented to person, place, and time.       Gait: Gait normal.   Psychiatric:         Mood and Affect: Mood normal.         Behavior: Behavior normal.            Harris Severe,   Family Medicine  03/23/2023

## 2023-03-27 LAB
T4 FREE: 1.1 NG/DL (ref 0.9–1.8)
TSH SERPL DL<=0.05 MIU/L-ACNC: 1.67 MCU/ML (ref 0.27–4.2)

## 2023-06-06 ENCOUNTER — TELEPHONE (OUTPATIENT)
Dept: FAMILY MEDICINE CLINIC | Facility: CLINIC | Age: 48
End: 2023-06-06

## 2023-06-06 DIAGNOSIS — N41.9 INFLAMMATORY DISEASE OF PROSTATE: Primary | ICD-10-CM

## 2023-06-06 NOTE — TELEPHONE ENCOUNTER
----- Message from Shawn Rush sent at 6/6/2023  8:53 AM EDT -----  Subject: Referral Request    Reason for referral request? Patient is having stomach concerns and   problems with his stool. Patient is looking for a specialist. Patient has   been seen in the ED a month ago. PLEASE CALL TODAY. Provider patient wants to be referred to(if known):     Provider Phone Number(if known): Additional Information for Provider?  Ericka Valencia REFFERAL TO [phone]   [address] [place] [address] is helping patient with the referral.   ---------------------------------------------------------------------------  --------------  [address]    [phone]; OK to leave message on voicemail  ---------------------------------------------------------------------------  --------------

## 2023-06-06 NOTE — TELEPHONE ENCOUNTER
Called number that was provided and spoke with Chente to inform that pt needs to call himself due to Chente not on the [facility]. Chente voiced understanding.